# Patient Record
(demographics unavailable — no encounter records)

---

## 2024-11-06 NOTE — PHYSICAL EXAM
[Healthy Appearing] : healthy appearing [Well Nourished] : well nourished [Interactive] : interactive [Normal Appearance] : normal appearance [Well formed] : well formed [Normally Set] : normally set [Normal S1 and S2] : normal S1 and S2 [Clear to Ausculation Bilaterally] : clear to auscultation bilaterally [Abdomen Soft] : soft [Abdomen Tenderness] : non-tender [] : no hepatosplenomegaly [Normal] : normal  [Murmur] : no murmurs [1] : was Aaron stage 1 [Testes] : normal [___] : [unfilled]

## 2024-11-06 NOTE — FAMILY HISTORY
[___ inches] : [unfilled] inches [de-identified] : hypothyroidism [FreeTextEntry1] : healthy [FreeTextEntry5] : 15-16 yrs [FreeTextEntry4] : [id number]" [id number]'11", Pat Uncle 64" [FreeTextEntry2] : only child

## 2024-11-06 NOTE — FAMILY HISTORY
[___ inches] : [unfilled] inches [de-identified] : hypothyroidism [FreeTextEntry1] : healthy [FreeTextEntry5] : 15-16 yrs [FreeTextEntry4] : [id number]" [id number]'11", Pat Uncle 64" [FreeTextEntry2] : only child

## 2024-11-06 NOTE — CONSULT LETTER
[Dear  ___] : Dear  [unfilled], [Consult Letter:] : I had the pleasure of evaluating your patient, [unfilled]. [Please see my note below.] : Please see my note below. [Consult Closing:] : Thank you very much for allowing me to participate in the care of this patient.  If you have any questions, please do not hesitate to contact me. [Sincerely,] : Sincerely, [FreeTextEntry3] : Mega Dominguez D.O.  for Pediatric Endocrinology Fellowship Residency Clerkship Director for Division  of Pediatric Endocrinology [facility]

## 2024-11-06 NOTE — DISCUSSION/SUMMARY
[FreeTextEntry1] : Patient is a 10-1/2-year-old male that presents today due to concerns of short stature.  It appears as though the measurement in 2023 was erroneous as he certainly has not shrunk at this time.  Additionally, he would have had an abnormally high rate of growth between 9910-5472 should it be correct.  I have recommended monitoring growth at his 6-month follow-up and obtaining a bone age x-ray at this time to make it a predicted height.  His height prediction for genetics is 66 inches with 4 inches being within 2 standard deviations.  I will continue to follow.

## 2024-11-06 NOTE — HISTORY OF PRESENT ILLNESS
[FreeTextEntry2] : Patient is a 10-1/2-year-old male that presents today as referred by the pediatrician due to concern for decline in growth velocity.  Review of the growth chart shows that he has generally been growing between the 10th and 25th percentiles and at his last well visit in October 2023 he was at the 17th percentile but then "trunk "and was at the 4th percentile more recently.  His height measurement decreased by 1 cm.  However the interval growth between October 2022 and October 2024 measures to be approximately 7-1/2 cm.  Mom states that much of his father side of the family is quite small.  They have always assumed that it is because of his genetics that he is on the smaller side.  Other than this, there are no other concerns.

## 2024-11-06 NOTE — CONSULT LETTER
[Dear  ___] : Dear  [unfilled], [Consult Letter:] : I had the pleasure of evaluating your patient, [unfilled]. [Please see my note below.] : Please see my note below. [Consult Closing:] : Thank you very much for allowing me to participate in the care of this patient.  If you have any questions, please do not hesitate to contact me. [Sincerely,] : Sincerely, Clear [FreeTextEntry3] : Mega Dominguez D.O.  for Pediatric Endocrinology Fellowship Residency Clerkship Director for Division  of Pediatric Endocrinology [facility]

## 2024-11-06 NOTE — DISCUSSION/SUMMARY
[FreeTextEntry1] : Patient is a 10-1/2-year-old male that presents today due to concerns of short stature.  It appears as though the measurement in 2023 was erroneous as he certainly has not shrunk at this time.  Additionally, he would have had an abnormally high rate of growth between 1403-4670 should it be correct.  I have recommended monitoring growth at his 6-month follow-up and obtaining a bone age x-ray at this time to make it a predicted height.  His height prediction for genetics is 66 inches with 4 inches being within 2 standard deviations.  I will continue to follow.